# Patient Record
Sex: FEMALE | Race: WHITE | NOT HISPANIC OR LATINO | URBAN - METROPOLITAN AREA
[De-identification: names, ages, dates, MRNs, and addresses within clinical notes are randomized per-mention and may not be internally consistent; named-entity substitution may affect disease eponyms.]

---

## 2020-11-12 ENCOUNTER — OFFICE VISIT (OUTPATIENT)
Dept: URBAN - METROPOLITAN AREA CLINIC 50 | Facility: CLINIC | Age: 45
End: 2020-11-12
Payer: COMMERCIAL

## 2020-11-12 VITALS
HEIGHT: 62 IN | WEIGHT: 109 LBS | SYSTOLIC BLOOD PRESSURE: 121 MMHG | HEART RATE: 87 BPM | TEMPERATURE: 97.9 F | DIASTOLIC BLOOD PRESSURE: 84 MMHG | BODY MASS INDEX: 20.06 KG/M2

## 2020-11-12 DIAGNOSIS — M25.50 ARTHRALGIA: ICD-10-CM

## 2020-11-12 DIAGNOSIS — K51.90 ULCERATIVE COLITIS: ICD-10-CM

## 2020-11-12 PROBLEM — 64766004 ULCERATIVE COLITIS: Status: ACTIVE | Noted: 2020-11-12

## 2020-11-12 PROCEDURE — 99213 OFFICE O/P EST LOW 20 MIN: CPT | Performed by: INTERNAL MEDICINE

## 2020-11-12 NOTE — HPI-OTHER HISTORIES
45 y.o. WF Bowels 10 / night No blood No pain Joints fair Wt stable 8/20 Labs - CBC, CMP, B12, Ferritin WNL Reports schertzer did scope last month and everything looked great

## 2022-11-30 ENCOUNTER — WEB ENCOUNTER (OUTPATIENT)
Dept: URBAN - METROPOLITAN AREA CLINIC 92 | Facility: CLINIC | Age: 47
End: 2022-11-30

## 2023-02-10 ENCOUNTER — OFFICE VISIT (OUTPATIENT)
Dept: URBAN - METROPOLITAN AREA CLINIC 50 | Facility: CLINIC | Age: 48
End: 2023-02-10
Payer: COMMERCIAL

## 2023-02-10 ENCOUNTER — WEB ENCOUNTER (OUTPATIENT)
Dept: URBAN - METROPOLITAN AREA CLINIC 50 | Facility: CLINIC | Age: 48
End: 2023-02-10

## 2023-02-10 VITALS
BODY MASS INDEX: 19.51 KG/M2 | DIASTOLIC BLOOD PRESSURE: 105 MMHG | TEMPERATURE: 97.6 F | SYSTOLIC BLOOD PRESSURE: 140 MMHG | WEIGHT: 106 LBS | HEIGHT: 62 IN | HEART RATE: 83 BPM

## 2023-02-10 DIAGNOSIS — Z90.49 HISTORY OF COLECTOMY: ICD-10-CM

## 2023-02-10 DIAGNOSIS — D50.0 IRON DEFICIENCY ANEMIA DUE TO CHRONIC BLOOD LOSS: ICD-10-CM

## 2023-02-10 PROBLEM — 724556004: Status: ACTIVE | Noted: 2023-02-10

## 2023-02-10 PROBLEM — 427816007: Status: ACTIVE | Noted: 2023-02-10

## 2023-02-10 PROCEDURE — 99214 OFFICE O/P EST MOD 30 MIN: CPT | Performed by: INTERNAL MEDICINE

## 2023-02-10 NOTE — HPI-TODAY'S VISIT:
47 y.o. WF Done 2+ yrs ago Kids are good - Sr going to [employer] next yr, 9th grade son at [employer], 8th grade son at [employer] Been feeling pretty good Just got IV iron x 1 w/ Manju Cabral on 1/4/22 Bowels fine - going enough No pain No N/V No blood in stool UTD w/ scopes w/ Schertzer - looks good Haven't seen derm  UTD w/ covid vaccine; hasn't had flu nor pneumonia Don't have PCP Sees [facility] now that [facility] retired Joints pretty good Still has menses - did have some heavy

## 2023-07-17 ENCOUNTER — TELEPHONE ENCOUNTER (OUTPATIENT)
Dept: URBAN - METROPOLITAN AREA CLINIC 96 | Facility: CLINIC | Age: 48
End: 2023-07-17

## 2023-08-09 ENCOUNTER — WEB ENCOUNTER (OUTPATIENT)
Dept: URBAN - METROPOLITAN AREA CLINIC 50 | Facility: CLINIC | Age: 48
End: 2023-08-09

## 2023-08-30 ENCOUNTER — DASHBOARD ENCOUNTERS (OUTPATIENT)
Age: 48
End: 2023-08-30

## 2023-11-02 ENCOUNTER — OFFICE VISIT (OUTPATIENT)
Dept: URBAN - METROPOLITAN AREA CLINIC 50 | Facility: CLINIC | Age: 48
End: 2023-11-02